# Patient Record
(demographics unavailable — no encounter records)

---

## 2025-05-08 NOTE — PHYSICAL EXAM
[de-identified] : 1 cm left thyroid nodule, well circumscribed and mobile [Laryngoscopy Performed] : laryngoscopy was performed, see procedure section for findings [Midline] : located in midline position [Normal] : orientation to person, place, and time: normal [de-identified] : < 1 cm left parotid mass, well circumscribed and mobile

## 2025-05-08 NOTE — HISTORY OF PRESENT ILLNESS
[de-identified] : prior evaluation of thyroid nodules and parotid node. cytologically benign. denies dysphagia, hoarseness, infections or new lesions. no changes medically since last visit. I have reviewed all old and new data and available images.  recent sonogram stable.

## 2025-05-08 NOTE — ASSESSMENT
[FreeTextEntry1] : will observe. sonogram next visit. bloods drawn. to call next week for results .  to return earlier if any change.  patient has been given the opportunity to ask questions, and all of the patient's questions have been answered to their satisfaction